# Patient Record
Sex: MALE | Race: BLACK OR AFRICAN AMERICAN | Employment: FULL TIME | URBAN - METROPOLITAN AREA
[De-identification: names, ages, dates, MRNs, and addresses within clinical notes are randomized per-mention and may not be internally consistent; named-entity substitution may affect disease eponyms.]

---

## 2020-03-09 ENCOUNTER — APPOINTMENT (OUTPATIENT)
Dept: GENERAL RADIOLOGY | Facility: HOSPITAL | Age: 35
End: 2020-03-09
Attending: EMERGENCY MEDICINE
Payer: OTHER GOVERNMENT

## 2020-03-09 ENCOUNTER — HOSPITAL ENCOUNTER (EMERGENCY)
Facility: HOSPITAL | Age: 35
Discharge: HOME | End: 2020-03-09
Attending: EMERGENCY MEDICINE
Payer: OTHER GOVERNMENT

## 2020-03-09 VITALS
RESPIRATION RATE: 16 BRPM | OXYGEN SATURATION: 98 % | HEART RATE: 72 BPM | TEMPERATURE: 99 F | DIASTOLIC BLOOD PRESSURE: 92 MMHG | SYSTOLIC BLOOD PRESSURE: 157 MMHG

## 2020-03-09 DIAGNOSIS — J11.1 INFLUENZA: Primary | ICD-10-CM

## 2020-03-09 LAB
FLUAV + FLUBV RNA SPEC NAA+PROBE: NEGATIVE
FLUAV + FLUBV RNA SPEC NAA+PROBE: NEGATIVE
FLUAV + FLUBV RNA SPEC NAA+PROBE: POSITIVE

## 2020-03-09 PROCEDURE — 71046 X-RAY EXAM CHEST 2 VIEWS: CPT | Performed by: EMERGENCY MEDICINE

## 2020-03-09 PROCEDURE — 99283 EMERGENCY DEPT VISIT LOW MDM: CPT

## 2020-03-09 PROCEDURE — 87798 DETECT AGENT NOS DNA AMP: CPT | Performed by: EMERGENCY MEDICINE

## 2020-03-09 PROCEDURE — 87999 UNLISTED MICROBIOLOGY PX: CPT

## 2020-03-09 PROCEDURE — 87502 INFLUENZA DNA AMP PROBE: CPT | Performed by: EMERGENCY MEDICINE

## 2020-03-09 RX ORDER — IPRATROPIUM BROMIDE AND ALBUTEROL SULFATE 2.5; .5 MG/3ML; MG/3ML
3 SOLUTION RESPIRATORY (INHALATION) ONCE
Status: DISCONTINUED | OUTPATIENT
Start: 2020-03-09 | End: 2020-03-09

## 2020-03-09 NOTE — ED INITIAL ASSESSMENT (HPI)
Pt reports having a headache, body aches, fever chills, fatigue and weakness x 5 days. Patient has been taking dayquil and nyquil with not much relief. Today patient took dayquil 4 hours PTA.

## 2020-03-09 NOTE — ED PROVIDER NOTES
Patient Seen in: [facility] Emergency Department      History   Patient presents with:  Cough/URI    Stated Complaint: cough, fever,     HPI    42-year-old -American male presents emerged from today for complaint of fever and cough.   Patient noted.  No masses are noted. No hernias are palpated. Extremity exam reveals no clubbing cyanosis or edema. Patient has good radial pulses noted bilaterally in the upper extremities .     There are no rashes noted on skin exam.      ED Course     Labs

## (undated) NOTE — ED AVS SNAPSHOT
Jenifer Ramesh   MRN: [id number]    Department:  [facility] Emergency Department   Date of Visit:  3/9/2020           Disclosure     Insurance plans vary and the physician(s) referred by the ER may not be covered by your plan.  Please contact your tell this physician (or your personal doctor if your instructions are to return to your personal doctor) about any new or lasting problems. The primary care or specialist physician will see patients referred from the [facility] Emergency Department.  Renard Flores